# Patient Record
Sex: FEMALE | Race: WHITE | NOT HISPANIC OR LATINO | Employment: UNEMPLOYED | URBAN - NONMETROPOLITAN AREA
[De-identification: names, ages, dates, MRNs, and addresses within clinical notes are randomized per-mention and may not be internally consistent; named-entity substitution may affect disease eponyms.]

---

## 2018-09-10 ENCOUNTER — CONSULT (OUTPATIENT)
Dept: CARDIOLOGY | Facility: CLINIC | Age: 73
End: 2018-09-10

## 2018-09-10 VITALS
BODY MASS INDEX: 43.59 KG/M2 | SYSTOLIC BLOOD PRESSURE: 148 MMHG | WEIGHT: 222 LBS | HEART RATE: 97 BPM | DIASTOLIC BLOOD PRESSURE: 72 MMHG | HEIGHT: 60 IN

## 2018-09-10 DIAGNOSIS — E78.00 HYPERCHOLESTEREMIA: ICD-10-CM

## 2018-09-10 DIAGNOSIS — R06.02 SHORTNESS OF BREATH: ICD-10-CM

## 2018-09-10 DIAGNOSIS — R00.0 TACHYCARDIA: ICD-10-CM

## 2018-09-10 DIAGNOSIS — I10 ESSENTIAL HYPERTENSION: ICD-10-CM

## 2018-09-10 DIAGNOSIS — I25.6 SILENT MYOCARDIAL ISCHEMIA: ICD-10-CM

## 2018-09-10 DIAGNOSIS — R01.1 MURMUR, CARDIAC: ICD-10-CM

## 2018-09-10 DIAGNOSIS — R00.2 PALPITATIONS: Primary | ICD-10-CM

## 2018-09-10 DIAGNOSIS — E88.81 METABOLIC SYNDROME: ICD-10-CM

## 2018-09-10 DIAGNOSIS — R94.31 ABNORMAL EKG: ICD-10-CM

## 2018-09-10 DIAGNOSIS — I27.20 PHT (PULMONARY HYPERTENSION) (HCC): ICD-10-CM

## 2018-09-10 DIAGNOSIS — R60.0 BILATERAL LEG EDEMA: ICD-10-CM

## 2018-09-10 DIAGNOSIS — G47.30 SLEEP APNEA IN ADULT: ICD-10-CM

## 2018-09-10 PROCEDURE — 93000 ELECTROCARDIOGRAM COMPLETE: CPT | Performed by: INTERNAL MEDICINE

## 2018-09-10 PROCEDURE — 99204 OFFICE O/P NEW MOD 45 MIN: CPT | Performed by: INTERNAL MEDICINE

## 2018-09-10 RX ORDER — OMEPRAZOLE 20 MG/1
20 CAPSULE, DELAYED RELEASE ORAL DAILY
COMMUNITY

## 2018-09-10 RX ORDER — ATORVASTATIN CALCIUM 10 MG/1
10 TABLET, FILM COATED ORAL DAILY
COMMUNITY

## 2018-09-10 NOTE — PROGRESS NOTES
CARDIAC COMPLAINTS  dyspnea, fatigue, lower extremity edema and palpitations      Subjective   Michelle Taylor is a 73 y.o. female came in today for her initial cardiac evaluation.  She has history of hypercholesterolemia, metabolic syndrome who has history of irregular heartbeat for many years.  In the past she apparently has some workup done at [place], but she doesn't remember the details.  She is now being followed by cardiologists.  About 2 years ago, she underwent gallbladder surgery and had an echocardiogram done as a pre op.  According to the report, she had normal LV systolic function, LVH and mild pulmonary hypertension.  She is now referred because she's been noticing worsening of edema of the lower extremities.  She has taken different diuretics with no change.  She has been having shortness of breath on mild-to-moderate exertion.  She denies having any chest pain.  She denies having any dizziness or loss of consciousness.  She does have palpitation on exertion.  She also feels fatigued and tired most of the time.  She apparently sleeps with her mouth open and in the morning she continues to be tired.  Her lab work which was done recently showed her hemoglobin is lower limit of normal.  Her total cholesterol was 162 with the LDL of 92.  Her TSH checked in March was normal.  She is not a smoker.  Her father  with Parkinson problem.  Her mother had history of aneurysm.    Past Surgical History:   Procedure Laterality Date   • ECHO - CONVERTED  10/05/2016    @ [facility]. Dr. Fraser. EF 65%. RVSP- 40 mmHg.       Current Outpatient Prescriptions   Medication Sig Dispense Refill   • atorvastatin (LIPITOR) 10 MG tablet Take 10 mg by mouth Daily.     • Multiple Vitamins-Minerals (MULTIVITAMIN ADULT PO) Take  by mouth.     • omeprazole (priLOSEC) 20 MG capsule Take 20 mg by mouth Daily.     • Polyethylene Glycol 3350 (MIRALAX PO) Take  by mouth Daily As Needed.     • aspirin 81 MG tablet Take 1 tablet by  "mouth Daily. 30 tablet 11   • metoprolol tartrate (LOPRESSOR) 25 MG tablet Take 1 tablet by mouth 2 (Two) Times a Day. 60 tablet 11     No current facility-administered medications for this visit.            ALLERGIES:  Oxycontin [oxycodone hcl]    Past Medical History:   Diagnosis Date   • Bleeding ulcer     in 2014   • Heart murmur    • History of cholecystectomy    • Hyperlipidemia    • Vertigo        History   Smoking Status   • Never Smoker   Smokeless Tobacco   • Never Used          Family History   Problem Relation Age of Onset   • Aneurysm Mother    • Parkinsonism Father        Review of Systems   Constitution: Positive for weakness and malaise/fatigue. Negative for decreased appetite.   HENT: Negative for congestion and sore throat.    Eyes: Negative for blurred vision.   Cardiovascular: Positive for dyspnea on exertion and palpitations. Negative for chest pain.   Respiratory: Positive for shortness of breath and snoring.    Endocrine: Negative for cold intolerance and heat intolerance.   Hematologic/Lymphatic: Negative for adenopathy. Does not bruise/bleed easily.   Skin: Negative for itching, nail changes and skin cancer.   Musculoskeletal: Negative for arthritis and myalgias.   Gastrointestinal: Negative for abdominal pain, dysphagia and heartburn.   Genitourinary: Negative for bladder incontinence and frequency.   Neurological: Negative for dizziness, light-headedness, seizures and vertigo.   Psychiatric/Behavioral: Negative for altered mental status.   Allergic/Immunologic: Negative for environmental allergies and hives.       Diabetes- No  Thyroid- normal    Objective     /72 (BP Location: Left arm)   Pulse 97   Ht 152.4 cm (60\")   Wt 101 kg (222 lb)   BMI 43.36 kg/m²     Physical Exam   Constitutional: She is oriented to person, place, and time. She appears well-developed and well-nourished.   HENT:   Head: Normocephalic.   Nose: Nose normal.   Eyes: Pupils are equal, round, and reactive to " light. EOM are normal.   Neck: Normal range of motion. Neck supple.   Cardiovascular: Normal rate, regular rhythm, S1 normal and S2 normal.    Murmur heard.  Pulmonary/Chest: Effort normal and breath sounds normal.   Abdominal: Soft. Bowel sounds are normal.   Musculoskeletal: Normal range of motion. She exhibits no edema.   Neurological: She is alert and oriented to person, place, and time.   Skin: Skin is warm and dry.   Psychiatric: She has a normal mood and affect.         ECG 12 Lead  Date/Time: 9/10/2018 11:28 AM  Performed by: BJORN CONTE  Authorized by: BJORN CONTE   Previous ECG: no previous ECG available  Rhythm: sinus rhythm  Rate: normal  Conduction: incomplete RBBB  QRS axis: left  Q waves: V3 and V4  Clinical impression: abnormal ECG              Assessment/Plan   Patient's Body mass index is 43.36 kg/m². BMI is above normal parameters. Recommendations include: educational material, exercise counseling and nutrition counseling.     Michelle was seen today for establish care, cardiac history, edema, shortness of breath and aspirin.    Diagnoses and all orders for this visit:    Palpitations    Shortness of breath    Metabolic syndrome    Bilateral leg edema  -     Adult Transthoracic Echo Complete W/ Cont if Necessary Per Protocol; Future    Murmur, cardiac  -     Adult Transthoracic Echo Complete W/ Cont if Necessary Per Protocol; Future    PHT (pulmonary hypertension)  -     Adult Transthoracic Echo Complete W/ Cont if Necessary Per Protocol; Future  -     Overnight Sleep Oximetry Study; Future    Sleep apnea in adult  -     Overnight Sleep Oximetry Study; Future    Silent myocardial ischemia  -     Stress Test With Myocardial Perfusion One Day; Future    Abnormal EKG  -     Stress Test With Myocardial Perfusion One Day; Future    Essential hypertension  -     metoprolol tartrate (LOPRESSOR) 25 MG tablet; Take 1 tablet by mouth 2 (Two) Times a Day.    Tachycardia  -     metoprolol  tartrate (LOPRESSOR) 25 MG tablet; Take 1 tablet by mouth 2 (Two) Times a Day.    Hypercholesteremia       At baseline, she is little tachycardic.  Her blood pressure is still high.  Her BMI is 43.  Her EKG showed sinus rhythm with PVC's, left axis deviation, incomplete right bundle branch block, possible old anterior wall MI.  Had a very long talk with her about the weight.  I gave her papers on Mediterranean diet.  Regarding her blood pressure and the heart rate, I started her on low-dose of beta blockers.  I also advised her to be on aspirin 81 mg once a day.  I explained to her about the EKG findings.  I talked to her about silent ischemia.  I also explained to her about her last echocardiogram and the pulmonary hypertension.  The bilateral leg edema could be related to that.  I advised her to undergo an nocturnal pulse PO2 measurement to rule out sleep apnea.  She may need sleep study.  I scheduled her to undergo an echocardiogram to evaluate the LV function and the PA pressure.  I also scheduled her to undergo a stress test in the form of Lexiscan to rule out silent ischemia.  Based on the results of these tests, further recommendations will be made.             Electronically signed by Kevin Roland MD September 10, 2018 11:21 AM

## 2018-09-10 NOTE — PATIENT INSTRUCTIONS
Mediterranean Diet  A Mediterranean diet refers to food and lifestyle choices that are based on the traditions of countries located on the [place]. This way of eating has been shown to help prevent certain conditions and improve outcomes for people who have chronic diseases, like kidney disease and heart disease.  What are tips for following this plan?  Lifestyle  · Cook and eat meals together with your family, when possible.  · Drink enough fluid to keep your urine clear or pale yellow.  · Be physically active every day. This includes:  ? Aerobic exercise like running or swimming.  ? Leisure activities like gardening, walking, or housework.  · Get 7-8 hours of sleep each night.  · If recommended by your health care provider, drink red wine in moderation. This means 1 glass a day for nonpregnant women and 2 glasses a day for men. A glass of wine equals 5 oz (150 mL).  Reading food labels  · Check the serving size of packaged foods. For foods such as rice and pasta, the serving size refers to the amount of cooked product, not dry.  · Check the total fat in packaged foods. Avoid foods that have saturated fat or trans fats.  · Check the ingredients list for added sugars, such as corn syrup.  Shopping  · At the grocery store, buy most of your food from the areas near the walls of the store. This includes:  ? Fresh fruits and vegetables (produce).  ? Grains, beans, nuts, and seeds. Some of these may be available in unpackaged forms or large amounts (in bulk).  ? Fresh seafood.  ? Poultry and eggs.  ? Low-fat dairy products.  · Buy whole ingredients instead of prepackaged foods.  · Buy fresh fruits and vegetables in-season from local farmers markets.  · Buy frozen fruits and vegetables in resealable bags.  · If you do not have access to quality fresh seafood, buy precooked frozen shrimp or canned fish, such as tuna, salmon, or sardines.  · Buy small amounts of raw or cooked vegetables, salads, or olives from the  deli or salad bar at your store.  · Stock your pantry so you always have certain foods on hand, such as olive oil, canned tuna, canned tomatoes, rice, pasta, and beans.  Cooking  · Cook foods with extra-virgin olive oil instead of using butter or other vegetable oils.  · Have meat as a side dish, and have vegetables or grains as your main dish. This means having meat in small portions or adding small amounts of meat to foods like pasta or stew.  · Use beans or vegetables instead of meat in common dishes like chili or lasagna.  · [place] with different cooking methods. Try roasting or broiling vegetables instead of steaming or sautéeing them.  · Add frozen vegetables to soups, stews, pasta, or rice.  · Add nuts or seeds for added healthy fat at each meal. You can add these to yogurt, salads, or vegetable dishes.  · Marinate fish or vegetables using olive oil, lemon juice, garlic, and fresh herbs.  Meal planning  · Plan to eat 1 vegetarian meal one day each week. Try to work up to 2 vegetarian meals, if possible.  · Eat seafood 2 or more times a week.  · Have healthy snacks readily available, such as:  ? Vegetable sticks with hummus.  ? Greek yogurt.  ? Fruit and nut trail mix.  · Eat balanced meals throughout the week. This includes:  ? Fruit: 2-3 servings a day  ? Vegetables: 4-5 servings a day  ? Low-fat dairy: 2 servings a day  ? Fish, poultry, or lean meat: 1 serving a day  ? Beans and legumes: 2 or more servings a week  ? Nuts and seeds: 1-2 servings a day  ? Whole grains: 6-8 servings a day  ? Extra-virgin olive oil: 3-4 servings a day  · Limit red meat and sweets to only a few servings a month  What are my food choices?  · Mediterranean diet  ? Recommended  ? Grains: Whole-grain pasta. Brown rice. Bulgar wheat. Polenta. Couscous. Whole-wheat bread. Oatmeal. Quinoa.  ? Vegetables: Artichokes. Beets. Broccoli. Cabbage. Carrots. Eggplant. Green beans. Chard. Kale. Spinach. Onions. Leeks. Peas. Squash.  Tomatoes. Peppers. Radishes.  ? Fruits: Apples. Apricots. Avocado. Berries. Bananas. Cherries. Dates. Figs. Grapes. Dolores. Melon. Oranges. Peaches. Plums. Pomegranate.  ? Meats and other protein foods: Beans. Almonds. Sunflower seeds. Pine nuts. Peanuts. Cod. [place]. Scallops. Shrimp. Tuna. Tilapia. Clams. Oysters. Eggs.  ? Dairy: Low-fat milk. Cheese. Greek yogurt.  ? Beverages: Water. Red wine. Herbal tea.  ? Fats and oils: Extra virgin olive oil. Avocado oil. Grape seed oil.  ? Sweets and desserts: Greek yogurt with honey. Baked apples. Poached pears. Trail mix.  ? Seasoning and other foods: Basil. Cilantro. Coriander. Cumin. Mint. Parsley. Gael. Rosemary. Tarragon. Garlic. Oregano. Thyme. Pepper. Balsalmic vinegar. Tahini. Hummus. Tomato sauce. Olives. Mushrooms.  ? Limit these  ? Grains: Prepackaged pasta or rice dishes. Prepackaged cereal with added sugar.  ? Vegetables: Deep fried potatoes (french fries).  ? Fruits: Fruit canned in syrup.  ? Meats and other protein foods: Beef. Pork. Lamb. Poultry with skin. Hot dogs. Posada.  ? Dairy: Ice cream. Sour cream. Whole milk.  ? Beverages: Juice. Sugar-sweetened soft drinks. Beer. Liquor and spirits.  ? Fats and oils: Butter. Canola oil. Vegetable oil. Beef fat (tallow). Lard.  ? Sweets and desserts: Cookies. Cakes. Pies. Candy.  ? Seasoning and other foods: Mayonnaise. Premade sauces and marinades.  ? The items listed may not be a complete list. Talk with your dietitian about what dietary choices are right for you.  Summary  · The Mediterranean diet includes both food and lifestyle choices.  · Eat a variety of fresh fruits and vegetables, beans, nuts, seeds, and whole grains.  · Limit the amount of red meat and sweets that you eat.  · Talk with your health care provider about whether it is safe for you to drink red wine in moderation. This means 1 glass a day for nonpregnant women and 2 glasses a day for men. A glass of wine equals 5 oz (150 mL).  This information  is not intended to replace advice given to you by your health care provider. Make sure you discuss any questions you have with your health care provider.  Document Released: 08/10/2017 Document Revised: 09/12/2017 Document Reviewed: 08/10/2017  [employer] Interactive Patient Education © 2018 [employer]

## 2018-09-13 ENCOUNTER — TELEPHONE (OUTPATIENT)
Dept: CARDIOLOGY | Facility: CLINIC | Age: 73
End: 2018-09-13

## 2018-09-13 NOTE — TELEPHONE ENCOUNTER
Patient called asking if she could do a treadmill stress test instead of the nuclear. States she doesn't feel comfortable doing the nuclear. Any recommendations?    [phone]

## 2018-09-16 NOTE — TELEPHONE ENCOUNTER
Yes. As long she knows that there are changes seen in the base line EKG. Results sensibility will be little low

## 2018-09-17 ENCOUNTER — APPOINTMENT (OUTPATIENT)
Dept: CARDIOLOGY | Facility: HOSPITAL | Age: 73
End: 2018-09-17
Attending: INTERNAL MEDICINE

## 2018-09-17 DIAGNOSIS — R07.2 PRECORDIAL PAIN: Primary | ICD-10-CM

## 2018-09-17 NOTE — TELEPHONE ENCOUNTER
Spoke with patient and advised her that treadmill stress test would not be as conclusive as the nuclear. Pt still wants to have walking stress first. Can you put order in? Thank you.

## 2018-09-18 NOTE — PROGRESS NOTES
Pt aware of results and recommendations.Faxing copy to PCP. Pt is going to contact PCP to discuss further testing.

## 2018-09-24 ENCOUNTER — HOSPITAL ENCOUNTER (OUTPATIENT)
Dept: CARDIOLOGY | Facility: HOSPITAL | Age: 73
Discharge: HOME | End: 2018-09-24
Attending: INTERNAL MEDICINE | Admitting: INTERNAL MEDICINE

## 2018-09-24 ENCOUNTER — HOSPITAL ENCOUNTER (OUTPATIENT)
Dept: CARDIOLOGY | Facility: HOSPITAL | Age: 73
End: 2018-09-24
Attending: INTERNAL MEDICINE

## 2018-09-24 DIAGNOSIS — I27.20 PHT (PULMONARY HYPERTENSION) (HCC): ICD-10-CM

## 2018-09-24 DIAGNOSIS — R01.1 MURMUR, CARDIAC: ICD-10-CM

## 2018-09-24 DIAGNOSIS — R60.0 BILATERAL LEG EDEMA: ICD-10-CM

## 2018-09-24 LAB
MAXIMAL PREDICTED HEART RATE: 149 BPM
STRESS TARGET HR: 127 BPM

## 2018-09-24 PROCEDURE — 93306 TTE W/DOPPLER COMPLETE: CPT

## 2018-09-24 PROCEDURE — 93306 TTE W/DOPPLER COMPLETE: CPT | Performed by: INTERNAL MEDICINE

## 2018-10-10 ENCOUNTER — TELEPHONE (OUTPATIENT)
Dept: CARDIOLOGY | Facility: CLINIC | Age: 73
End: 2018-10-10

## 2018-10-10 NOTE — TELEPHONE ENCOUNTER
Pt called requesting results of her Echo, results discussed at length with her.  She has had to cancel her stress for a while due to hurting her back but is planning on rescheduling it when it is doing better.

## 2018-10-23 ENCOUNTER — APPOINTMENT (OUTPATIENT)
Dept: CARDIOLOGY | Facility: HOSPITAL | Age: 73
End: 2018-10-23
Attending: INTERNAL MEDICINE

## 2018-12-10 ENCOUNTER — OFFICE VISIT (OUTPATIENT)
Dept: CARDIOLOGY | Facility: CLINIC | Age: 73
End: 2018-12-10

## 2018-12-10 VITALS
HEIGHT: 60 IN | WEIGHT: 226 LBS | HEART RATE: 72 BPM | BODY MASS INDEX: 44.37 KG/M2 | SYSTOLIC BLOOD PRESSURE: 130 MMHG | DIASTOLIC BLOOD PRESSURE: 80 MMHG

## 2018-12-10 DIAGNOSIS — I27.20 PHT (PULMONARY HYPERTENSION) (HCC): ICD-10-CM

## 2018-12-10 DIAGNOSIS — R60.0 BILATERAL LEG EDEMA: ICD-10-CM

## 2018-12-10 DIAGNOSIS — E78.00 HYPERCHOLESTEREMIA: ICD-10-CM

## 2018-12-10 DIAGNOSIS — I10 ESSENTIAL HYPERTENSION: Primary | ICD-10-CM

## 2018-12-10 DIAGNOSIS — G47.30 SLEEP APNEA IN ADULT: ICD-10-CM

## 2018-12-10 PROBLEM — I49.3 PVC (PREMATURE VENTRICULAR CONTRACTION): Status: ACTIVE | Noted: 2018-12-10

## 2018-12-10 PROCEDURE — 99214 OFFICE O/P EST MOD 30 MIN: CPT | Performed by: NURSE PRACTITIONER

## 2018-12-10 NOTE — PATIENT INSTRUCTIONS
"DASH Eating Plan  DASH stands for \"Dietary Approaches to Stop Hypertension.\" The DASH eating plan is a healthy eating plan that has been shown to reduce high blood pressure (hypertension). It may also reduce your risk for type 2 diabetes, heart disease, and stroke. The DASH eating plan may also help with weight loss.  What are tips for following this plan?  General guidelines  · Avoid eating more than 2,300 mg (milligrams) of salt (sodium) a day. If you have hypertension, you may need to reduce your sodium intake to 1,500 mg a day.  · Limit alcohol intake to no more than 1 drink a day for nonpregnant women and 2 drinks a day for men. One drink equals 12 oz of beer, 5 oz of wine, or 1½ oz of hard liquor.  · Work with your health care provider to maintain a healthy body weight or to lose weight. Ask what an ideal weight is for you.  · Get at least 30 minutes of exercise that causes your heart to beat faster (aerobic exercise) most days of the week. Activities may include walking, swimming, or biking.  · Work with your health care provider or diet and nutrition specialist (dietitian) to adjust your eating plan to your individual calorie needs.  Reading food labels  · Check food labels for the amount of sodium per serving. Choose foods with less than 5 percent of the Daily Value of sodium. Generally, foods with less than 300 mg of sodium per serving fit into this eating plan.  · To find whole grains, look for the word \"whole\" as the first word in the ingredient list.  Shopping  · Buy products labeled as \"low-sodium\" or \"no salt added.\"  · Buy fresh foods. Avoid canned foods and premade or frozen meals.  Cooking  · Avoid adding salt when cooking. Use salt-free seasonings or herbs instead of table salt or sea salt. Check with your health care provider or pharmacist before using salt substitutes.  · Do not collier foods. Cook foods using healthy methods such as baking, boiling, grilling, and broiling instead.  · Cook with " heart-healthy oils, such as olive, canola, soybean, or sunflower oil.  Meal planning    · Eat a balanced diet that includes:  ? 5 or more servings of fruits and vegetables each day. At each meal, try to fill half of your plate with fruits and vegetables.  ? Up to 6-8 servings of whole grains each day.  ? Less than 6 oz of lean meat, poultry, or fish each day. A 3-oz serving of meat is about the same size as a deck of cards. One egg equals 1 oz.  ? 2 servings of low-fat dairy each day.  ? A serving of nuts, seeds, or beans 5 times each week.  ? Heart-healthy fats. Healthy fats called Omega-3 fatty acids are found in foods such as flaxseeds and coldwater fish, like sardines, salmon, and mackerel.  · Limit how much you eat of the following:  ? Canned or prepackaged foods.  ? Food that is high in trans fat, such as fried foods.  ? Food that is high in saturated fat, such as fatty meat.  ? Sweets, desserts, sugary drinks, and other foods with added sugar.  ? Full-fat dairy products.  · Do not salt foods before eating.  · Try to eat at least 2 vegetarian meals each week.  · Eat more home-cooked food and less restaurant, buffet, and fast food.  · When eating at a restaurant, ask that your food be prepared with less salt or no salt, if possible.  What foods are recommended?  The items listed may not be a complete list. Talk with your dietitian about what dietary choices are best for you.  Grains  Whole-grain or whole-wheat bread. Whole-grain or whole-wheat pasta. Brown rice. Oatmeal. Quinoa. Bulgur. Whole-grain and low-sodium cereals. Celina bread. Low-fat, low-sodium crackers. Whole-wheat flour tortillas.  Vegetables  Fresh or frozen vegetables (raw, steamed, roasted, or grilled). Low-sodium or reduced-sodium tomato and vegetable juice. Low-sodium or reduced-sodium tomato sauce and tomato paste. Low-sodium or reduced-sodium canned vegetables.  Fruits  All fresh, dried, or frozen fruit. Canned fruit in natural juice (without  added sugar).  Meat and other protein foods  Skinless chicken or turkey. Ground chicken or turkey. Pork with fat trimmed off. Fish and seafood. Egg whites. Dried beans, peas, or lentils. Unsalted nuts, nut butters, and seeds. Unsalted canned beans. Lean cuts of beef with fat trimmed off. Low-sodium, lean deli meat.  Dairy  Low-fat (1%) or fat-free (skim) milk. Fat-free, low-fat, or reduced-fat cheeses. Nonfat, low-sodium ricotta or cottage cheese. Low-fat or nonfat yogurt. Low-fat, low-sodium cheese.  Fats and oils  Soft margarine without trans fats. Vegetable oil. Low-fat, reduced-fat, or light mayonnaise and salad dressings (reduced-sodium). Canola, safflower, olive, soybean, and sunflower oils. Avocado.  Seasoning and other foods  Herbs. Spices. Seasoning mixes without salt. Unsalted popcorn and pretzels. Fat-free sweets.  What foods are not recommended?  The items listed may not be a complete list. Talk with your dietitian about what dietary choices are best for you.  Grains  Baked goods made with fat, such as croissants, muffins, or some breads. Dry pasta or rice meal packs.  Vegetables  Creamed or fried vegetables. Vegetables in a cheese sauce. Regular canned vegetables (not low-sodium or reduced-sodium). Regular canned tomato sauce and paste (not low-sodium or reduced-sodium). Regular tomato and vegetable juice (not low-sodium or reduced-sodium). Pickles. Olives.  Fruits  Canned fruit in a light or heavy syrup. Fried fruit. Fruit in cream or butter sauce.  Meat and other protein foods  Fatty cuts of meat. Ribs. Fried meat. Posada. Sausage. Bologna and other processed lunch meats. Salami. Fatback. Hotdogs. Bratwurst. Salted nuts and seeds. Canned beans with added salt. Canned or smoked fish. Whole eggs or egg yolks. Chicken or turkey with skin.  Dairy  Whole or 2% milk, cream, and half-and-half. Whole or full-fat cream cheese. Whole-fat or sweetened yogurt. Full-fat cheese. Nondairy creamers. Whipped toppings.  Processed cheese and cheese spreads.  Fats and oils  Butter. Stick margarine. Lard. Shortening. Ghee. Posada fat. Tropical oils, such as coconut, palm kernel, or palm oil.  Seasoning and other foods  Salted popcorn and pretzels. Onion salt, garlic salt, seasoned salt, table salt, and sea salt. Worcestershire sauce. Tartar sauce. Barbecue sauce. Teriyaki sauce. Soy sauce, including reduced-sodium. Steak sauce. Canned and packaged gravies. Fish sauce. Oyster sauce. Cocktail sauce. Horseradish that you find on the shelf. Ketchup. Mustard. Meat flavorings and tenderizers. Bouillon cubes. Hot sauce and Tabasco sauce. Premade or packaged marinades. Premade or packaged taco seasonings. Relishes. Regular salad dressings.  Where to find more information:  · National Heart, Lung, and Blood [place]: www.nhlbi.nih.gov  · American Heart Association: www.heart.org  Summary  · The DASH eating plan is a healthy eating plan that has been shown to reduce high blood pressure (hypertension). It may also reduce your risk for type 2 diabetes, heart disease, and stroke.  · With the DASH eating plan, you should limit salt (sodium) intake to 2,300 mg a day. If you have hypertension, you may need to reduce your sodium intake to 1,500 mg a day.  · When on the DASH eating plan, aim to eat more fresh fruits and vegetables, whole grains, lean proteins, low-fat dairy, and heart-healthy fats.  · Work with your health care provider or diet and nutrition specialist (dietitian) to adjust your eating plan to your individual calorie needs.  This information is not intended to replace advice given to you by your health care provider. Make sure you discuss any questions you have with your health care provider.  Document Released: 12/06/2012 Document Revised: 12/11/2017 Document Reviewed: 12/11/2017  [employer] Interactive Patient Education © 2018 [employer]

## 2018-12-10 NOTE — PROGRESS NOTES
Chief Complaint   Patient presents with   • Follow-up     Cardiac management. She has not had stress test due fall. She is to have labs 12/21/18 per PCP.   • Shortness of Breath     Only if she climbs steps.       Subjective       Michelle Taylor is a 73 y.o. female with a history of hyperlipidemia, metabolic syndrome and an irregular heart beat for many years. She was referred for her initial cardiac evaluation in September 2018 secondary to worsening lower extremity edema, left greater than right. She has taken two different diuretics which did not help. She feels the leg swelling is secondary to knee problems, her weight, and lack of mobility. She denies any history of DVT. US was negative in the past. She denied chest pain but reported SOB with moderate exertion like climbing stairs. EKG at consult showed sinus rhythm incomplete RBBB and PVC. Lopressor was started. She underwent overnight oximetry which was significantly abnormal but no further work up since she did not feel she would tolerate CPAP. Echo showed normal LVEF with moderately elevated PA pressure. She did not want to do Lexiscan stress and doesn't feel her back is strong enough to walk on the treadmill. She came in today for follow up. She tolerates Lopressor. Palpitations are hardly noticeable. Cholesterol is well controlled with Lipitor at 162, LDL 92. TSH was normal in March.     HPI         Cardiac History:    Past Surgical History:   Procedure Laterality Date   • ECHO - CONVERTED  10/05/2016    @ [facility]. Dr. Fraser. EF 65%. RVSP- 40 mmHg.   • ECHO - CONVERTED  09/24/2018    TLS. EF 65%. RVSP- 49 mmHg.   • OTHER SURGICAL HISTORY  09/13/2018    Pulse O2-265 min < 88%       Current Outpatient Medications   Medication Sig Dispense Refill   • aspirin 81 MG tablet Take 1 tablet by mouth Daily. 30 tablet 11   • atorvastatin (LIPITOR) 10 MG tablet Take 10 mg by mouth Daily.     • metoprolol tartrate (LOPRESSOR) 25 MG tablet Take 1 tablet by mouth 2  (Two) Times a Day. 60 tablet 11   • Multiple Vitamins-Minerals (MULTIVITAMIN ADULT PO) Take  by mouth.     • omeprazole (priLOSEC) 20 MG capsule Take 20 mg by mouth Daily.     • Polyethylene Glycol 3350 (MIRALAX PO) Take  by mouth Daily As Needed.       No current facility-administered medications for this visit.        Oxycontin [oxycodone hcl]    Past Medical History:   Diagnosis Date   • Bleeding ulcer     in 2014   • Heart murmur    • History of cholecystectomy    • Hyperlipidemia    • Vertigo        Social History     Socioeconomic History   • Marital status:      Spouse name: Not on file   • Number of children: Not on file   • Years of education: Not on file   • Highest education level: Not on file   Social Needs   • Financial resource strain: Not on file   • Food insecurity - worry: Not on file   • Food insecurity - inability: Not on file   • Transportation needs - medical: Not on file   • Transportation needs - non-medical: Not on file   Occupational History   • Not on file   Tobacco Use   • Smoking status: Never Smoker   • Smokeless tobacco: Never Used   Substance and Sexual Activity   • Alcohol use: No   • Drug use: No   • Sexual activity: Not on file   Other Topics Concern   • Not on file   Social History Narrative   • Not on file       Family History   Problem Relation Age of Onset   • Aneurysm Mother    • Parkinsonism Father        Review of Systems   Constitution: Positive for malaise/fatigue and weight gain. Negative for decreased appetite.   HENT: Negative.    Eyes: Negative.    Cardiovascular: Positive for dyspnea on exertion (mild ) and leg swelling. Negative for chest pain, orthopnea, palpitations and syncope.   Respiratory: Positive for shortness of breath (none at rest, only with exertion). Negative for cough.    Endocrine: Negative.    Hematologic/Lymphatic: Negative.    Skin: Negative.    Musculoskeletal: Positive for joint pain (left knee ), joint swelling and stiffness (knees,  "shoulders after fall ).   Gastrointestinal: Positive for constipation (with iron supplement ). Negative for abdominal pain and melena.   Genitourinary: Negative for dysuria and hematuria.   Neurological: Negative for dizziness.   Psychiatric/Behavioral: Negative for depression.   Allergic/Immunologic: Negative.       Diabetes- No  Thyroid-normal    Objective     /80 (BP Location: Right arm)   Pulse 72   Ht 152.4 cm (60\")   Wt 103 kg (226 lb)   BMI 44.14 kg/m²     Physical Exam   Constitutional: She is oriented to person, place, and time. She appears well-developed and well-nourished. No distress.   HENT:   Head: Normocephalic.   Eyes: Pupils are equal, round, and reactive to light.   Neck: Normal range of motion. Neck supple.   Cardiovascular: Normal rate, regular rhythm and intact distal pulses.   Pulmonary/Chest: Effort normal and breath sounds normal. No respiratory distress. She has no wheezes. She has no rales.   Abdominal: Soft. Bowel sounds are normal. She exhibits distension.   Musculoskeletal: Normal range of motion. She exhibits edema (2+ bilateral LE edema ).   Neurological: She is alert and oriented to person, place, and time.   Skin: Skin is warm and dry. She is not diaphoretic.   Psychiatric: She has a normal mood and affect.   Nursing note and vitals reviewed.     Procedures          Assessment/Plan    Heart rate and blood pressure are well controlled. No ectopy noted on clinical exam. She is tolerating Lopressor, so continue the same. We reviewed the findings of her echocardiogram showing normal LVEF with elevated PA pressure at 49 mmHg which is likely secondary to sleep apnea. We reviewed her overnight and she plans to discuss further with Jayne but feels she would not tolerate CPAP. Lipids are well controlled with Lipitor, so continue the same. Regarding the edema, she has had no response to diuretic and she has been looking for compression stockings to go above the knee. Encouraged her " to elevate legs while sitting. Limit sodium to 1,500 mg daily. She is not interested in proceeding with stress test at this time, so will continue conservative management. Continue beta blocker. She plans to have labs with you later this month. Could you forward copy? BMI is elevated and we discussed strategies to decrease calorie intake. DASH diet provided. She appears stable at this time. We will see her back in six months or sooner if needed.   Michelle was seen today for follow-up and shortness of breath.    Diagnoses and all orders for this visit:    Essential hypertension    Hypercholesteremia    PHT (pulmonary hypertension) (CMS/[facility])    Sleep apnea in adult    Bilateral leg edema        Patient's Body mass index is 44.14 kg/m². BMI is above normal parameters. Recommendations include: nutrition counseling.               Electronically signed by TELLY Arnold,  December 10, 2018 10:59 AM

## 2019-06-17 ENCOUNTER — OFFICE VISIT (OUTPATIENT)
Dept: CARDIOLOGY | Facility: CLINIC | Age: 74
End: 2019-06-17

## 2019-06-17 VITALS
DIASTOLIC BLOOD PRESSURE: 90 MMHG | HEART RATE: 68 BPM | HEIGHT: 60 IN | SYSTOLIC BLOOD PRESSURE: 138 MMHG | WEIGHT: 231 LBS | BODY MASS INDEX: 45.35 KG/M2

## 2019-06-17 DIAGNOSIS — E78.00 HYPERCHOLESTEREMIA: ICD-10-CM

## 2019-06-17 DIAGNOSIS — G47.30 SLEEP APNEA IN ADULT: ICD-10-CM

## 2019-06-17 DIAGNOSIS — I10 ESSENTIAL HYPERTENSION: ICD-10-CM

## 2019-06-17 DIAGNOSIS — I49.3 PVC (PREMATURE VENTRICULAR CONTRACTION): Primary | ICD-10-CM

## 2019-06-17 DIAGNOSIS — R63.5 WEIGHT GAIN: ICD-10-CM

## 2019-06-17 DIAGNOSIS — R60.0 BILATERAL LEG EDEMA: ICD-10-CM

## 2019-06-17 DIAGNOSIS — E66.01 MORBID OBESITY WITH BMI OF 45.0-49.9, ADULT (HCC): ICD-10-CM

## 2019-06-17 PROCEDURE — 99214 OFFICE O/P EST MOD 30 MIN: CPT | Performed by: NURSE PRACTITIONER

## 2019-06-17 NOTE — PROGRESS NOTES
Chief Complaint   Patient presents with   • Follow-up     For cardiac management. Patient is on aspirin. Reports that she does have shortness of breath if she climbs too many stairs. Last lab work was done about 2 weeks ago per PCP, not in chart. Reports that she is seeing Dr. Simpson about swelling in legs and feet.   • Med Refill     Needs refills on metoprolol. 30 day supplies to [facility].       Cardiac Complaints  edema      Subjective   Michelle Taylor is a 74 y.o. female with hyperlipidemia, chronic bilateral lower leg edema, metabolic syndrome, nocturnal desaturation, and PVCs for many years. She was referred for her initial cardiac evaluation in September 2018 secondary to worsening lower extremity edema, left greater than right. She had taken two different diuretics which did not help.  She denied any history of DVT. US was negative in the past. She denied chest pain but reported SOB with moderate exertion like climbing stairs. EKG at consult showed sinus rhythm incomplete RBBB and PVCs. Lopressor was started. She underwent overnight oximetry which was significantly abnormal but no further work up since she did not feel she would tolerate CPAP. Echo showed normal LVEF with moderately elevated PA pressure. She did not want to do Lexiscan stress and did not feel her back would tolerate the treadmill.  She returns today for follow up and denies any new concerns.  She does report some issues with continued shortness of breath on climbing stairs but denies any worse than prior.  She does admit to now wearing a CPAP at night and has done well with it so far.  She has been on it about a month.  She follows with Dr. Garcia on July 5th for her first follow up with CPAP therapy.  Edema has been managed by Dr. Simpson and has prescribed a pump for swelling at home which she should receive over the next month.  She does report he has diagnosed it as a chronic lymphedema.  She states all workup on venous  and arterial system of both legs was normal.  She states labs remain managed with PCP and were last checked about 2 weeks ago.  No copy available for review.  Refills of metoprolol requested for 30 day supply.        Cardiac History  Past Surgical History:   Procedure Laterality Date   • ECHO - CONVERTED  10/05/2016    @ [facility]. Dr. Fraser. EF 65%. RVSP- 40 mmHg.   • ECHO - CONVERTED  09/24/2018    TLS. EF 65%. RVSP- 49 mmHg.   • OTHER SURGICAL HISTORY  09/13/2018    Pulse O2-265 min < 88%       Current Outpatient Medications   Medication Sig Dispense Refill   • aspirin 81 MG tablet Take 1 tablet by mouth Daily. 30 tablet 11   • atorvastatin (LIPITOR) 10 MG tablet Take 10 mg by mouth Daily.     • IRON PO Take  by mouth Every Other Day.     • metoprolol tartrate (LOPRESSOR) 25 MG tablet Take 1 tablet by mouth 2 (Two) Times a Day. 60 tablet 11   • Multiple Vitamins-Minerals (MULTIVITAMIN ADULT PO) Take  by mouth.     • omeprazole (priLOSEC) 20 MG capsule Take 20 mg by mouth Daily.     • Polyethylene Glycol 3350 (MIRALAX PO) Take  by mouth Daily As Needed.       No current facility-administered medications for this visit.        Oxycontin [oxycodone hcl]    Past Medical History:   Diagnosis Date   • Bleeding ulcer     in 2014   • Heart murmur    • History of cholecystectomy    • Hyperlipidemia    • Vertigo        Social History     Socioeconomic History   • Marital status:      Spouse name: Not on file   • Number of children: Not on file   • Years of education: Not on file   • Highest education level: Not on file   Tobacco Use   • Smoking status: Never Smoker   • Smokeless tobacco: Never Used   Substance and Sexual Activity   • Alcohol use: No   • Drug use: No       Family History   Problem Relation Age of Onset   • Aneurysm Mother    • Parkinsonism Father        Review of Systems   Constitution: Negative for weakness and malaise/fatigue.   Cardiovascular: Positive for dyspnea on exertion and leg swelling. Negative  "for chest pain, claudication, irregular heartbeat, near-syncope, orthopnea, palpitations and syncope.   Respiratory: Positive for shortness of breath. Negative for cough and wheezing.    Musculoskeletal: Negative for back pain, joint pain and joint swelling.   Gastrointestinal: Negative for anorexia, heartburn, nausea and vomiting.   Genitourinary: Negative for dysuria, hematuria, hesitancy and nocturia.   Neurological: Negative for dizziness, light-headedness and loss of balance.   Psychiatric/Behavioral: Negative for depression and memory loss. The patient is not nervous/anxious.            Objective     /90 (BP Location: Left arm)   Pulse 68   Ht 152.4 cm (60\")   Wt 105 kg (231 lb)   BMI 45.11 kg/m²     Physical Exam   Constitutional: She is oriented to person, place, and time. She appears well-developed and well-nourished.   HENT:   Head: Normocephalic and atraumatic.   Eyes: EOM are normal. Pupils are equal, round, and reactive to light.   Neck: Normal range of motion. Neck supple.   Cardiovascular: Normal rate. A regularly irregular rhythm present.   Murmur heard.  Pulmonary/Chest: Effort normal and breath sounds normal.   Abdominal: Soft.   Musculoskeletal: Normal range of motion. She exhibits edema.   Neurological: She is alert and oriented to person, place, and time.   Skin: Skin is warm and dry.   Psychiatric: She has a normal mood and affect.       Procedures    Assessment/Plan     HR is stable and rhythm appears to be regularly irregular with early beat noted after about 8 to 10 beats.  Current lopressor dosing continued for management of PVCs.  Blood pressure is stable and HTN well managed on current.  No adjustment to current therapy will be advised.  She is now following with Dr. Simpson in regards to chronic BLE edema and states she has been worked up for vascular concerns, which have all been negative.  Patient reports she was advised that her problems seem to be more lymphatic in nature " and she states she has been prescribed a pump at home for edema management which she will receive soon.  Patient urged to continue to limit her sodium intake as well as any processed foods.  We discussed re-addition of diuretic therapy but she states she has not found them to be of any benefit as swelling remains.  She is now wearing CPAP for sleep apnea which she states is followed by Dr. Garcia.  Patient reports she has adjusted to the mask but does not particularly love it but does admit to compliance.  She will follow with Dr. Garcia in July for management.  Labs patient reports are monitored with your office, including FLP for hyperlipidemia management.  Patient admits to tolerating her statin therapy well, same dosing continued.  Patient admits to following with your office for lab management.  Could we have next for our review?  Refills of metoprolol sent per request.  BMI noted at 45.11 with about a 6 pound weight gain noted.  Patient encouraged to limit caloric intake, carbs, and sodium.  6 month follow up recommended or sooner if needed.        Problems Addressed this Visit        Cardiovascular and Mediastinum    Hypercholesteremia    Essential hypertension    Relevant Medications    metoprolol tartrate (LOPRESSOR) 25 MG tablet    PVC (premature ventricular contraction) - Primary    Relevant Medications    metoprolol tartrate (LOPRESSOR) 25 MG tablet       Respiratory    Sleep apnea in adult       Other    Bilateral leg edema      Other Visit Diagnoses     Morbid obesity with BMI of 45.0-49.9, adult (CMS/[facility])        Weight gain              Patient's Body mass index is 45.11 kg/m². BMI is above normal parameters. Recommendations include: nutrition counseling.            Electronically signed by TELLY Garner June 17, 2019 5:05 PM

## 2019-12-17 ENCOUNTER — OFFICE VISIT (OUTPATIENT)
Dept: CARDIOLOGY | Facility: CLINIC | Age: 74
End: 2019-12-17

## 2019-12-17 VITALS
WEIGHT: 241 LBS | HEIGHT: 60 IN | SYSTOLIC BLOOD PRESSURE: 152 MMHG | HEART RATE: 79 BPM | DIASTOLIC BLOOD PRESSURE: 82 MMHG | BODY MASS INDEX: 47.32 KG/M2 | OXYGEN SATURATION: 97 %

## 2019-12-17 DIAGNOSIS — R60.0 BILATERAL LEG EDEMA: Primary | ICD-10-CM

## 2019-12-17 DIAGNOSIS — I27.20 PHT (PULMONARY HYPERTENSION) (HCC): ICD-10-CM

## 2019-12-17 DIAGNOSIS — E78.00 HYPERCHOLESTEREMIA: ICD-10-CM

## 2019-12-17 DIAGNOSIS — G47.30 SLEEP APNEA IN ADULT: ICD-10-CM

## 2019-12-17 DIAGNOSIS — I10 ESSENTIAL HYPERTENSION: ICD-10-CM

## 2019-12-17 DIAGNOSIS — I49.3 PVC (PREMATURE VENTRICULAR CONTRACTION): ICD-10-CM

## 2019-12-17 PROCEDURE — 99213 OFFICE O/P EST LOW 20 MIN: CPT | Performed by: NURSE PRACTITIONER

## 2019-12-17 NOTE — PROGRESS NOTES
Chief Complaint   Patient presents with   • Follow-up     Cardiac Management. Pt c/o legs swelling alot. No cardiac issues today. Pt states she had labs about Aug (at [facility]).    • Med Management     PCP takes care of refills. Pt does not take ASA anymore.        Subjective       Michelle Taylor is a 74 y.o. female with hyperlipidemia, chronic bilateral lower leg edema, metabolic syndrome, nocturnal desaturation, and PVCs for many years. She was referred for her initial cardiac evaluation in September 2018 secondary to worsening lower extremity edema, left greater than right. She had taken two different diuretics which did not help.  She denied any history of DVT. US was negative in the past. She denied chest pain but reported SOB with moderate exertion like climbing stairs. EKG at consult showed sinus rhythm incomplete RBBB and PVCs. Lopressor was started. She underwent overnight oximetry which was significantly abnormal now wearing CPAP followed by Dr. Garcia.  Echo showed normal LVEF with moderately elevated PA pressure. She did not want to do Lexiscan stress and did not feel her back would tolerate the treadmill. She came today for follow up. She denies cardiac symptoms, no chest pain or shortness of breath. She reports undergoing a procedure by Dr. Saini to evaluate peripheral blood flow and was told she has lymphedema. She was prescribed lymphatic pump but has not received. Labs are followed by TOO Gillespie NP.   HPI         Cardiac History:    Past Surgical History:   Procedure Laterality Date   • ECHO - CONVERTED  10/05/2016    @ [facility]. Dr. Fraser. EF 65%. RVSP- 40 mmHg.   • ECHO - CONVERTED  09/24/2018    TLS. EF 65%. RVSP- 49 mmHg.   • OTHER SURGICAL HISTORY  09/13/2018    Pulse O2-265 min < 88%       Current Outpatient Medications   Medication Sig Dispense Refill   • atorvastatin (LIPITOR) 10 MG tablet Take 10 mg by mouth Daily.     • IRON PO Take  by mouth Every Other Day.     • metoprolol  tartrate (LOPRESSOR) 25 MG tablet Take 1 tablet by mouth 2 (Two) Times a Day. 60 tablet 11   • Multiple Vitamins-Minerals (MULTIVITAMIN ADULT PO) Take  by mouth.     • omeprazole (priLOSEC) 20 MG capsule Take 20 mg by mouth Daily.     • Polyethylene Glycol 3350 (MIRALAX PO) Take  by mouth Daily As Needed.     • aspirin 81 MG tablet Take 1 tablet by mouth Daily. 30 tablet 11     No current facility-administered medications for this visit.      Oxycontin [oxycodone hcl]    Past Medical History:   Diagnosis Date   • Bleeding ulcer     in 2014   • Heart murmur    • History of cholecystectomy    • Hyperlipidemia    • Vertigo        Social History     Socioeconomic History   • Marital status:      Spouse name: Not on file   • Number of children: Not on file   • Years of education: Not on file   • Highest education level: Not on file   Tobacco Use   • Smoking status: Never Smoker   • Smokeless tobacco: Never Used   Substance and Sexual Activity   • Alcohol use: No   • Drug use: No   • Sexual activity: Defer       Family History   Problem Relation Age of Onset   • Aneurysm Mother    • Parkinsonism Father      Review of Systems   Constitution: Positive for weight gain (up 10 lb). Negative for decreased appetite and malaise/fatigue.   HENT: Negative.    Eyes: Negative.    Cardiovascular: Positive for leg swelling (significant, chronic ). Negative for chest pain, claudication, dyspnea on exertion, orthopnea and palpitations.   Respiratory: Negative for cough and shortness of breath.    Endocrine: Negative.    Hematologic/Lymphatic: Negative.    Skin: Negative.    Musculoskeletal: Negative for falls and myalgias.   Gastrointestinal: Negative for abdominal pain and melena.   Genitourinary: Negative for dysuria and hematuria.   Neurological: Negative for dizziness.   Psychiatric/Behavioral: Negative for altered mental status and depression.   Allergic/Immunologic: Negative.       Diabetes- No  Thyroid-normal    Objective  "    /82 (BP Location: Right arm, Patient Position: Sitting, Cuff Size: Adult)   Pulse 79   Ht 152.4 cm (60\")   Wt 109 kg (241 lb)   SpO2 97%   BMI 47.07 kg/m²     Physical Exam   Constitutional: She is oriented to person, place, and time. She appears well-developed and well-nourished. No distress.   HENT:   Head: Normocephalic.   Eyes: Pupils are equal, round, and reactive to light.   Neck: Normal range of motion. Neck supple.   Cardiovascular: Normal rate and regular rhythm.   Pulmonary/Chest: Effort normal and breath sounds normal. No respiratory distress. She has no wheezes.   Abdominal: Soft. Bowel sounds are normal.   Musculoskeletal: Normal range of motion. She exhibits edema (2-3 + LE edema ).   Neurological: She is alert and oriented to person, place, and time.   Skin: Skin is warm and dry. She is not diaphoretic.   Psychiatric: She has a normal mood and affect.   Nursing note and vitals reviewed.     Procedures          Assessment/Plan      Michelle was seen today for follow-up and med management.    Diagnoses and all orders for this visit:    Bilateral leg edema    Sleep apnea in adult    PVC (premature ventricular contraction)    PHT (pulmonary hypertension) (CMS/HCC)    Essential hypertension    Hypercholesteremia    Heart rate is stable. Blood pressure is slightly elevated today. She does monitor at home which has been normal. She admits to white coat. Advised to continue monitoring with goal of systolic <130-140.  Continue Metoprolol.  Limit sodium intake. We reviewed echocardiogram from 2018 which showed normal LV function and elevated PA pressure.  She is now wearing CPAP for the last 6 months feeling better.  She has no ectopy on clinical exam, no PVCs noted.  Palpitations are well controlled with beta-blocker.  She has tried diuretics in the past for the lower extremity swelling which have not been beneficial.  She awaiting lymphatic pump treatment of lower extremity lymphedema.  Continue " leg elevation and compression when possible.  Lipids are managed with low-dose atorvastatin with labs followed by PCP.  She continues to be mildly anemic which is followed with PCP as well.  Heart healthy diet encouraged, limit sugars and carbohydrates while increasing protein intake.  She is unable to do much exercise secondary to her legs.  No new testing ordered today as she has declined stress test and has no anginal symptoms.  We will see her back in 6 months for follow-up or sooner if she develops any new symptoms.    Patient's Body mass index is 47.07 kg/m². BMI is above normal parameters. Recommendations include: nutrition counseling.               Electronically signed by TELLY Arnold,  December 19, 2019 10:19 AM

## 2020-06-22 ENCOUNTER — OFFICE VISIT (OUTPATIENT)
Dept: CARDIOLOGY | Facility: CLINIC | Age: 75
End: 2020-06-22

## 2020-06-22 VITALS
HEART RATE: 74 BPM | BODY MASS INDEX: 49.36 KG/M2 | SYSTOLIC BLOOD PRESSURE: 138 MMHG | WEIGHT: 251.4 LBS | TEMPERATURE: 98.5 F | HEIGHT: 60 IN | DIASTOLIC BLOOD PRESSURE: 76 MMHG

## 2020-06-22 DIAGNOSIS — E78.00 HYPERCHOLESTEREMIA: ICD-10-CM

## 2020-06-22 DIAGNOSIS — E88.81 METABOLIC SYNDROME: ICD-10-CM

## 2020-06-22 DIAGNOSIS — G47.30 SLEEP APNEA IN ADULT: ICD-10-CM

## 2020-06-22 DIAGNOSIS — E66.01 MORBID OBESITY WITH BMI OF 45.0-49.9, ADULT (HCC): ICD-10-CM

## 2020-06-22 DIAGNOSIS — R60.0 BILATERAL LEG EDEMA: ICD-10-CM

## 2020-06-22 DIAGNOSIS — R00.2 PALPITATIONS: ICD-10-CM

## 2020-06-22 DIAGNOSIS — I10 ESSENTIAL HYPERTENSION: Primary | ICD-10-CM

## 2020-06-22 DIAGNOSIS — I27.20 PHT (PULMONARY HYPERTENSION) (HCC): ICD-10-CM

## 2020-06-22 PROCEDURE — 99213 OFFICE O/P EST LOW 20 MIN: CPT | Performed by: NURSE PRACTITIONER

## 2020-06-22 NOTE — PROGRESS NOTES
Chief Complaint   Patient presents with   • Follow-up     Cardiac management . Had labs done per PCP. Has no cardiac complaints today.   • Med Refill     Needs refills on Metoprolol 90 day supply to [facility]       Subjective       Michelle Taylor is a 75 y.o. female with hyperlipidemia, chronic bilateral lower leg edema, metabolic syndrome, nocturnal desaturation, and PVCs for many years. She was referred for her initial cardiac evaluation in September 2018 secondary to worsening lower extremity edema, left greater than right. She had taken two different diuretics which did not help.  She denied any history of DVT. US was negative in the past. She denied chest pain but reported SOB with moderate exertion like climbing stairs. EKG at consult showed sinus rhythm incomplete RBBB and PVCs. Lopressor was started. She underwent overnight oximetry which was significantly abnormal now wearing CPAP followed by Dr. Garcia.  Echo showed normal LVEF with moderately elevated PA pressure. She did not want to do Lexiscan stress and did not feel her back would tolerate the treadmill. She reports undergoing a procedure by Dr. Saini to evaluate peripheral blood flow and was told she has lymphedema. She was prescribed lymphatic pump.    Today she comes to the office for follow-up visit.  She denies chest pain or worsening shortness of breath.  She no longer has palpitations.  Swelling in her lower legs is unchanged and she continues to follow with .  She does use compression stockings and lymphatic pump.  However, she currently has an ulceration on her left lower leg which is being treated at wound care center.  No recent change in medications noted.    HPI     Cardiac History:    Past Surgical History:   Procedure Laterality Date   • ECHO - CONVERTED  10/05/2016    @ [facility]. Dr. Fraser. EF 65%. RVSP- 40 mmHg.   • ECHO - CONVERTED  09/24/2018    TLS. EF 65%. RVSP- 49 mmHg.   • OTHER SURGICAL HISTORY  09/13/2018     Pulse O2-265 min < 88%       Current Outpatient Medications   Medication Sig Dispense Refill   • atorvastatin (LIPITOR) 10 MG tablet Take 10 mg by mouth Daily.     • metoprolol tartrate (LOPRESSOR) 25 MG tablet Take 1 tablet by mouth 2 (Two) Times a Day. 180 tablet 3   • Multiple Vitamins-Minerals (MULTIVITAMIN ADULT PO) Take  by mouth.     • omeprazole (priLOSEC) 20 MG capsule Take 20 mg by mouth Daily.     • Polyethylene Glycol 3350 (MIRALAX PO) Take  by mouth Daily As Needed.     • aspirin 81 MG tablet Take 1 tablet by mouth Daily. 30 tablet 11   • IRON PO Take  by mouth Every Other Day.       No current facility-administered medications for this visit.        Oxycontin [oxycodone hcl]    Past Medical History:   Diagnosis Date   • Bleeding ulcer     in 2014   • Heart murmur    • History of cholecystectomy    • Hyperlipidemia    • Vertigo        Social History     Socioeconomic History   • Marital status:      Spouse name: Not on file   • Number of children: Not on file   • Years of education: Not on file   • Highest education level: Not on file   Tobacco Use   • Smoking status: Never Smoker   • Smokeless tobacco: Never Used   Substance and Sexual Activity   • Alcohol use: No   • Drug use: No   • Sexual activity: Defer       Family History   Problem Relation Age of Onset   • Aneurysm Mother    • Parkinsonism Father        Review of Systems   Constitution: Positive for malaise/fatigue (improved with use of CPAP).   HENT: Negative.    Eyes: Negative.    Cardiovascular: Positive for leg swelling (related to lymphedema). Negative for chest pain, dyspnea on exertion and near-syncope.   Respiratory: Positive for shortness of breath and sleep disturbances due to breathing (uses CPAP with benefit).    Endocrine: Negative for polydipsia and polyphagia.   Hematologic/Lymphatic: Negative for bleeding problem. Does not bruise/bleed easily.   Skin: Positive for color change (Redness and lower legs) and poor wound  "healing.   Musculoskeletal: Positive for myalgias (Relates to swelling of her lower legs). Negative for falls.   Gastrointestinal: Positive for constipation (managed with medication). Negative for heartburn, melena and nausea.   Genitourinary: Negative for dysuria and hematuria.   Neurological: Negative for dizziness, headaches, numbness and weakness.   Psychiatric/Behavioral: Negative for memory loss. The patient does not have insomnia and is not nervous/anxious.         Objective     /76 (BP Location: Right arm)   Pulse 74   Temp 98.5 °F (36.9 °C)   Ht 152.4 cm (60\")   Wt 114 kg (251 lb 6.4 oz)   BMI 49.10 kg/m²     Physical Exam   Constitutional: She is oriented to person, place, and time. She appears well-nourished.   HENT:   Head: Normocephalic.   Eyes: Pupils are equal, round, and reactive to light. Conjunctivae are normal.   Neck: Normal range of motion. Neck supple. Carotid bruit is not present.   Cardiovascular: Normal rate, regular rhythm, S1 normal and S2 normal.   No murmur heard.  Pulses:       Radial pulses are 2+ on the right side, and 2+ on the left side.   Pulmonary/Chest: Breath sounds normal. She has no rales.   Abdominal: Soft. Bowel sounds are normal. There is no tenderness.   Musculoskeletal: Normal range of motion. She exhibits edema (Legs) and tenderness.   Neurological: She is alert and oriented to person, place, and time.   Skin: Skin is warm.   Psychiatric: She has a normal mood and affect. Her behavior is normal.        Procedures: none today         Assessment/Plan      Michelle was seen today for follow-up and med refill.    Diagnoses and all orders for this visit:    Essential hypertension  -     metoprolol tartrate (LOPRESSOR) 25 MG tablet; Take 1 tablet by mouth 2 (Two) Times a Day.    Hypercholesteremia    Palpitations    PHT (pulmonary hypertension) (CMS/HCC)    Sleep apnea in adult    Bilateral leg edema    Metabolic syndrome    Morbid obesity with BMI of 45.0-49.9, adult " (CMS/[facility])      Her blood pressure, heart rate, and heart rhythm are normal.  Palpitations have subsided with medication management.  Advised to continue same dose metoprolol and refills faxed to her pharmacy.    She admits to using CPAP therapy with benefit.  I encouraged her to continue treatment as this will also decrease cardiac risk as well as improving her symptoms of fatigue.    For management of cholesterol she is on high intensity statin therapy for Lipitor 10 mg daily.  She will follow PCP for lab orders/management.  Please forward copy of next lab results?    Patient's Body mass index is 49.1 kg/m². BMI is above normal parameters. Recommendations include: nutrition counseling.  Her weight is up 10 pounds since last office visit.  Mediterranean diet and diet for weight loss encouraged.     Michelle Taylor  reports that she has never smoked. She has never used smokeless tobacco.      From a cardiac standpoint patient appears stable.  No repeat cardiac testing advised at this time.  Should she develop any symptoms or concerns she understands to call.  Otherwise, we will see her back in 6 months.             Electronically signed by TELLY Oviedo,  June 22, 2020 12:32

## 2020-12-22 ENCOUNTER — OFFICE VISIT (OUTPATIENT)
Dept: CARDIOLOGY | Facility: CLINIC | Age: 75
End: 2020-12-22

## 2020-12-22 VITALS
TEMPERATURE: 97.3 F | HEIGHT: 60 IN | BODY MASS INDEX: 49.98 KG/M2 | HEART RATE: 72 BPM | DIASTOLIC BLOOD PRESSURE: 82 MMHG | WEIGHT: 254.6 LBS | SYSTOLIC BLOOD PRESSURE: 140 MMHG

## 2020-12-22 DIAGNOSIS — I10 ESSENTIAL HYPERTENSION: Primary | ICD-10-CM

## 2020-12-22 DIAGNOSIS — G47.30 SLEEP APNEA IN ADULT: ICD-10-CM

## 2020-12-22 DIAGNOSIS — R60.0 BILATERAL LEG EDEMA: ICD-10-CM

## 2020-12-22 DIAGNOSIS — R00.2 PALPITATIONS: ICD-10-CM

## 2020-12-22 DIAGNOSIS — R01.1 MURMUR, CARDIAC: ICD-10-CM

## 2020-12-22 DIAGNOSIS — E78.00 HYPERCHOLESTEREMIA: ICD-10-CM

## 2020-12-22 PROCEDURE — 99213 OFFICE O/P EST LOW 20 MIN: CPT | Performed by: NURSE PRACTITIONER

## 2020-12-22 NOTE — PROGRESS NOTES
Chief Complaint   Patient presents with   • Follow-up     Cardiac managment .Has no cardiac complaints today.   • Labs     reportd she will have labs per PCP soon to obtain medication refills.   • Med Refill     No refills needed today. Reviewed meds verbally   • Edema     To bilat lower legs, has diagnosisi of Lymphedema        Subjective       Michelle Taylor is a 75 y.o. female with hyperlipidemia, chronic bilateral lower leg edema, metabolic syndrome, nocturnal desaturation, and PVCs for many years. She was referred for her initial cardiac evaluation in September 2018 secondary to worsening lower extremity edema, left greater than right. She had taken two different diuretics which did not help.  She denied any history of DVT. US was negative in the past. She denied chest pain but reported SOB with moderate exertion like climbing stairs. EKG at consult showed sinus rhythm incomplete RBBB and PVCs. Lopressor was started. She underwent overnight oximetry which was significantly abnormal now wearing CPAP followed by Dr. Garcia.  Echo showed normal LVEF with moderately elevated PA pressure. She did not want to do Lexiscan stress and did not feel her back would tolerate the treadmill. She reports undergoing a procedure by Dr. Saini to evaluate peripheral blood flow and was told she has lymphedema. She was prescribed lymphatic pump.    Today she comes to the office for a follow up visit.  She denies cardiac symptoms or concerns.  Her activity is limited due to significant swelling in her legs.  She continues conservative management for lymphedema and is followed at wound care center for management of ulceration on left lower leg.  No chest pain, palpitations, or increased shortness of breath noted.  No recent change in medications reported.    Cardiac History:    Past Surgical History:   Procedure Laterality Date   • ECHO - CONVERTED  10/05/2016    @ [facility]. Dr. Fraser. EF 65%. RVSP- 40 mmHg.   • ECHO - CONVERTED   09/24/2018    TLS. EF 65%. RVSP- 49 mmHg.   • OTHER SURGICAL HISTORY  09/13/2018    Pulse O2-265 min < 88%       Current Outpatient Medications   Medication Sig Dispense Refill   • aspirin 81 MG tablet Take 1 tablet by mouth Daily. 30 tablet 11   • atorvastatin (LIPITOR) 10 MG tablet Take 10 mg by mouth Daily.     • IRON PO Take  by mouth Every Other Day.     • metoprolol tartrate (LOPRESSOR) 25 MG tablet Take 1 tablet by mouth 2 (Two) Times a Day. 180 tablet 3   • Multiple Vitamins-Minerals (MULTIVITAMIN ADULT PO) Take  by mouth.     • omeprazole (priLOSEC) 20 MG capsule Take 20 mg by mouth Daily.     • Polyethylene Glycol 3350 (MIRALAX PO) Take  by mouth Daily As Needed.       No current facility-administered medications for this visit.        Oxycontin [oxycodone hcl]    Past Medical History:   Diagnosis Date   • Bleeding ulcer     in 2014   • Heart murmur    • History of cholecystectomy    • Hyperlipidemia    • Lymphedema of both lower extremities 2018   • Vertigo        Social History     Socioeconomic History   • Marital status:      Spouse name: Not on file   • Number of children: Not on file   • Years of education: Not on file   • Highest education level: Not on file   Tobacco Use   • Smoking status: Never Smoker   • Smokeless tobacco: Never Used   Substance and Sexual Activity   • Alcohol use: No   • Drug use: No   • Sexual activity: Defer       Family History   Problem Relation Age of Onset   • Aneurysm Mother    • Parkinsonism Father        Review of Systems   Constitution: Negative for decreased appetite, diaphoresis and malaise/fatigue.   HENT: Negative for nosebleeds.    Eyes: Negative for blurred vision.   Cardiovascular: Positive for leg swelling. Negative for chest pain, claudication, cyanosis, dyspnea on exertion, irregular heartbeat, near-syncope, orthopnea, palpitations, paroxysmal nocturnal dyspnea and syncope.   Respiratory: Positive for shortness of breath and sleep disturbances due to  "breathing (uses CPAP with benefit). Negative for snoring.    Endocrine: Negative for cold intolerance and heat intolerance.   Hematologic/Lymphatic: Does not bruise/bleed easily.   Skin: Positive for poor wound healing. Negative for rash.   Musculoskeletal: Positive for joint pain (Not a new or worsening problem). Negative for myalgias.   Gastrointestinal: Negative for heartburn, melena and nausea.   Genitourinary: Negative for dysuria and hematuria.   Neurological: Negative for dizziness and light-headedness.   Psychiatric/Behavioral: The patient does not have insomnia and is not nervous/anxious.         BP Readings from Last 5 Encounters:   12/22/20 140/82   06/22/20 138/76   12/17/19 152/82   06/17/19 138/90   12/10/18 130/80       Wt Readings from Last 5 Encounters:   12/22/20 115 kg (254 lb 9.6 oz)   06/22/20 114 kg (251 lb 6.4 oz)   12/17/19 109 kg (241 lb)   06/17/19 105 kg (231 lb)   12/10/18 103 kg (226 lb)         Objective     /82 (BP Location: Left arm)   Pulse 72   Temp 97.3 °F (36.3 °C)   Ht 152.4 cm (60\")   Wt 115 kg (254 lb 9.6 oz)   BMI 49.72 kg/m²     Vitals signs and nursing note reviewed.   Eyes:      Pupils: Pupils are equal, round, and reactive to light.   HENT:      Head: Normocephalic.   Neck:      Musculoskeletal: Edema present.      Vascular: No carotid bruit.   Pulmonary:      Breath sounds: Normal breath sounds.   Cardiovascular:      Normal rate. Regular rhythm.   Edema:     Peripheral edema present.  Abdominal:      General: Bowel sounds are normal.      Palpations: Abdomen is soft.   Musculoskeletal: Normal range of motion.   Skin:     General: Skin is warm.   Neurological:      Mental Status: Alert and oriented to person, place, and time.          Procedures: none today          Assessment/Plan     Diagnoses and all orders for this visit:    1. Essential hypertension (Primary)    2. Palpitations    3. Hypercholesteremia    4. Murmur, cardiac    5. Sleep apnea in adult    6. " Bilateral leg edema      Hypertension/palpitations -blood pressure normal today.  Patient denies recent palpitations.  Continue beta-blocker.     Hypercholesterolemia-patient plans to see you this week and anticipates repeat labs.  Please send copy of lab results when available.    Cardiac murmur-clinically stable.  No repeat testing advised at this time.    CLEMENT-admits to compliance with CPAP therapy.  She understands the benefit to her overall health for management of sleep apnea.    Patient's Body mass index is 49.72 kg/m². BMI is above normal parameters. Recommendations include: nutrition counseling.  Weight loss encouraged.    Bilateral leg edema-has lymphedema.  Currently is unable to use lymphatic pump due to leg ulceration.  She does have compression stockings in place and elevates legs periodically for management.    Yearly follow-up visit scheduled.  Please call sooner for chronic concerns.           Electronically signed by TELLY Oviedo,  December 22, 2020 13:09 EST

## 2021-08-19 DIAGNOSIS — I10 ESSENTIAL HYPERTENSION: ICD-10-CM

## 2021-08-19 NOTE — TELEPHONE ENCOUNTER
Patient left a  requesting refill on her metoprolol to [facility].  She is changing from [facility] pharmacy.